# Patient Record
Sex: MALE | Race: WHITE | NOT HISPANIC OR LATINO | URBAN - METROPOLITAN AREA
[De-identification: names, ages, dates, MRNs, and addresses within clinical notes are randomized per-mention and may not be internally consistent; named-entity substitution may affect disease eponyms.]

---

## 2017-01-04 ENCOUNTER — COMMUNICATION - HEALTHEAST (OUTPATIENT)
Dept: FAMILY MEDICINE | Facility: CLINIC | Age: 72
End: 2017-01-04

## 2017-01-04 DIAGNOSIS — I10 HYPERTENSION: ICD-10-CM

## 2017-01-09 ENCOUNTER — OFFICE VISIT - HEALTHEAST (OUTPATIENT)
Dept: PHYSICAL THERAPY | Facility: REHABILITATION | Age: 72
End: 2017-01-09

## 2017-01-09 DIAGNOSIS — M25.673 DECREASED RANGE OF MOTION OF ANKLE: ICD-10-CM

## 2017-01-09 DIAGNOSIS — M62.81 MUSCLE WEAKNESS (GENERALIZED): ICD-10-CM

## 2017-01-09 DIAGNOSIS — M79.671 RIGHT FOOT PAIN: ICD-10-CM

## 2017-03-09 ENCOUNTER — COMMUNICATION - HEALTHEAST (OUTPATIENT)
Dept: FAMILY MEDICINE | Facility: CLINIC | Age: 72
End: 2017-03-09

## 2017-03-24 ENCOUNTER — COMMUNICATION - HEALTHEAST (OUTPATIENT)
Dept: FAMILY MEDICINE | Facility: CLINIC | Age: 72
End: 2017-03-24

## 2017-04-02 ENCOUNTER — COMMUNICATION - HEALTHEAST (OUTPATIENT)
Dept: FAMILY MEDICINE | Facility: CLINIC | Age: 72
End: 2017-04-02

## 2017-04-02 DIAGNOSIS — I10 HYPERTENSION: ICD-10-CM

## 2017-04-24 ENCOUNTER — OFFICE VISIT - HEALTHEAST (OUTPATIENT)
Dept: FAMILY MEDICINE | Facility: CLINIC | Age: 72
End: 2017-04-24

## 2017-04-24 DIAGNOSIS — H11.31 BURST BLOOD VESSEL IN EYE, RIGHT: ICD-10-CM

## 2017-04-24 DIAGNOSIS — I10 ESSENTIAL HYPERTENSION: ICD-10-CM

## 2017-04-24 ASSESSMENT — MIFFLIN-ST. JEOR: SCORE: 1859.58

## 2017-06-23 ENCOUNTER — OFFICE VISIT - HEALTHEAST (OUTPATIENT)
Dept: FAMILY MEDICINE | Facility: CLINIC | Age: 72
End: 2017-06-23

## 2017-06-23 DIAGNOSIS — N13.8 BPH WITH URINARY OBSTRUCTION: ICD-10-CM

## 2017-06-23 DIAGNOSIS — Z00.01 ENCOUNTER FOR GENERAL ADULT MEDICAL EXAMINATION WITH ABNORMAL FINDINGS: ICD-10-CM

## 2017-06-23 DIAGNOSIS — I10 ESSENTIAL HYPERTENSION WITH GOAL BLOOD PRESSURE LESS THAN 130/80: ICD-10-CM

## 2017-06-23 DIAGNOSIS — E78.5 HYPERLIPIDEMIA, UNSPECIFIED HYPERLIPIDEMIA TYPE: ICD-10-CM

## 2017-06-23 DIAGNOSIS — E66.3 OVERWEIGHT (BMI 25.0-29.9): ICD-10-CM

## 2017-06-23 DIAGNOSIS — N40.1 BPH WITH URINARY OBSTRUCTION: ICD-10-CM

## 2017-06-23 LAB
CHOLEST SERPL-MCNC: 189 MG/DL
FASTING STATUS PATIENT QL REPORTED: YES
HDLC SERPL-MCNC: 42 MG/DL
LDLC SERPL CALC-MCNC: 126 MG/DL
TRIGL SERPL-MCNC: 103 MG/DL

## 2017-06-23 ASSESSMENT — MIFFLIN-ST. JEOR: SCORE: 1819.11

## 2017-06-25 ENCOUNTER — COMMUNICATION - HEALTHEAST (OUTPATIENT)
Dept: FAMILY MEDICINE | Facility: CLINIC | Age: 72
End: 2017-06-25

## 2017-07-06 ENCOUNTER — COMMUNICATION - HEALTHEAST (OUTPATIENT)
Dept: FAMILY MEDICINE | Facility: CLINIC | Age: 72
End: 2017-07-06

## 2017-07-06 DIAGNOSIS — I10 HYPERTENSION: ICD-10-CM

## 2017-08-02 ENCOUNTER — COMMUNICATION - HEALTHEAST (OUTPATIENT)
Dept: FAMILY MEDICINE | Facility: CLINIC | Age: 72
End: 2017-08-02

## 2017-08-02 DIAGNOSIS — N40.1 BPH WITH URINARY OBSTRUCTION: ICD-10-CM

## 2017-08-02 DIAGNOSIS — N13.8 BPH WITH URINARY OBSTRUCTION: ICD-10-CM

## 2017-10-13 ENCOUNTER — AMBULATORY - HEALTHEAST (OUTPATIENT)
Dept: NURSING | Facility: CLINIC | Age: 72
End: 2017-10-13

## 2017-10-13 DIAGNOSIS — Z23 NEED FOR IMMUNIZATION AGAINST INFLUENZA: ICD-10-CM

## 2017-11-08 ENCOUNTER — AMBULATORY - HEALTHEAST (OUTPATIENT)
Dept: FAMILY MEDICINE | Facility: CLINIC | Age: 72
End: 2017-11-08

## 2017-11-08 ENCOUNTER — COMMUNICATION - HEALTHEAST (OUTPATIENT)
Dept: FAMILY MEDICINE | Facility: CLINIC | Age: 72
End: 2017-11-08

## 2017-11-08 DIAGNOSIS — N40.1 BPH WITH URINARY OBSTRUCTION: ICD-10-CM

## 2017-11-08 DIAGNOSIS — N13.8 BPH WITH URINARY OBSTRUCTION: ICD-10-CM

## 2017-11-16 ENCOUNTER — RECORDS - HEALTHEAST (OUTPATIENT)
Dept: ADMINISTRATIVE | Facility: OTHER | Age: 72
End: 2017-11-16

## 2017-11-30 ENCOUNTER — RECORDS - HEALTHEAST (OUTPATIENT)
Dept: ADMINISTRATIVE | Facility: OTHER | Age: 72
End: 2017-11-30

## 2017-12-21 ENCOUNTER — RECORDS - HEALTHEAST (OUTPATIENT)
Dept: ADMINISTRATIVE | Facility: OTHER | Age: 72
End: 2017-12-21

## 2018-01-11 ENCOUNTER — OFFICE VISIT - HEALTHEAST (OUTPATIENT)
Dept: FAMILY MEDICINE | Facility: CLINIC | Age: 73
End: 2018-01-11

## 2018-01-11 DIAGNOSIS — E66.3 OVERWEIGHT (BMI 25.0-29.9): ICD-10-CM

## 2018-01-11 DIAGNOSIS — I10 ESSENTIAL HYPERTENSION WITH GOAL BLOOD PRESSURE LESS THAN 130/80: ICD-10-CM

## 2018-01-11 DIAGNOSIS — E78.00 HYPERCHOLESTEREMIA: ICD-10-CM

## 2018-01-11 DIAGNOSIS — N40.1 BPH WITH URINARY OBSTRUCTION: ICD-10-CM

## 2018-01-11 DIAGNOSIS — N13.8 BPH WITH URINARY OBSTRUCTION: ICD-10-CM

## 2018-01-11 DIAGNOSIS — Z01.810 PREOP CARDIOVASCULAR EXAM: ICD-10-CM

## 2018-01-11 LAB
ANION GAP SERPL CALCULATED.3IONS-SCNC: 7 MMOL/L (ref 5–18)
ATRIAL RATE - MUSE: 70 BPM
BUN SERPL-MCNC: 22 MG/DL (ref 8–28)
CALCIUM SERPL-MCNC: 9.6 MG/DL (ref 8.5–10.5)
CHLORIDE BLD-SCNC: 105 MMOL/L (ref 98–107)
CO2 SERPL-SCNC: 29 MMOL/L (ref 22–31)
CREAT SERPL-MCNC: 0.89 MG/DL (ref 0.7–1.3)
DIASTOLIC BLOOD PRESSURE - MUSE: NORMAL MMHG
ERYTHROCYTE [DISTWIDTH] IN BLOOD BY AUTOMATED COUNT: 11.6 % (ref 11–14.5)
GFR SERPL CREATININE-BSD FRML MDRD: >60 ML/MIN/1.73M2
GLUCOSE BLD-MCNC: 92 MG/DL (ref 70–125)
HCT VFR BLD AUTO: 46.9 % (ref 40–54)
HGB BLD-MCNC: 15.9 G/DL (ref 14–18)
INTERPRETATION ECG - MUSE: NORMAL
MCH RBC QN AUTO: 32.1 PG (ref 27–34)
MCHC RBC AUTO-ENTMCNC: 33.8 G/DL (ref 32–36)
MCV RBC AUTO: 95 FL (ref 80–100)
P AXIS - MUSE: 26 DEGREES
PLATELET # BLD AUTO: 259 THOU/UL (ref 140–440)
PMV BLD AUTO: 7.6 FL (ref 7–10)
POTASSIUM BLD-SCNC: 4.9 MMOL/L (ref 3.5–5)
PR INTERVAL - MUSE: 202 MS
QRS DURATION - MUSE: 78 MS
QT - MUSE: 374 MS
QTC - MUSE: 403 MS
R AXIS - MUSE: 48 DEGREES
RBC # BLD AUTO: 4.93 MILL/UL (ref 4.4–6.2)
SODIUM SERPL-SCNC: 141 MMOL/L (ref 136–145)
SYSTOLIC BLOOD PRESSURE - MUSE: NORMAL MMHG
T AXIS - MUSE: 63 DEGREES
VENTRICULAR RATE- MUSE: 70 BPM
WBC: 6.3 THOU/UL (ref 4–11)

## 2018-01-11 ASSESSMENT — MIFFLIN-ST. JEOR: SCORE: 1878.08

## 2018-01-12 ASSESSMENT — MIFFLIN-ST. JEOR: SCORE: 1878.08

## 2018-01-15 ENCOUNTER — ANESTHESIA - HEALTHEAST (OUTPATIENT)
Dept: SURGERY | Facility: HOSPITAL | Age: 73
End: 2018-01-15

## 2018-01-15 ENCOUNTER — COMMUNICATION - HEALTHEAST (OUTPATIENT)
Dept: FAMILY MEDICINE | Facility: CLINIC | Age: 73
End: 2018-01-15

## 2018-01-15 ENCOUNTER — SURGERY - HEALTHEAST (OUTPATIENT)
Dept: SURGERY | Facility: HOSPITAL | Age: 73
End: 2018-01-15

## 2018-01-15 ASSESSMENT — MIFFLIN-ST. JEOR: SCORE: 1884.31

## 2018-03-01 ENCOUNTER — RECORDS - HEALTHEAST (OUTPATIENT)
Dept: ADMINISTRATIVE | Facility: OTHER | Age: 73
End: 2018-03-01

## 2018-07-12 ENCOUNTER — COMMUNICATION - HEALTHEAST (OUTPATIENT)
Dept: FAMILY MEDICINE | Facility: CLINIC | Age: 73
End: 2018-07-12

## 2018-07-12 DIAGNOSIS — I10 HYPERTENSION: ICD-10-CM

## 2018-08-03 ENCOUNTER — OFFICE VISIT - HEALTHEAST (OUTPATIENT)
Dept: FAMILY MEDICINE | Facility: CLINIC | Age: 73
End: 2018-08-03

## 2018-08-03 ENCOUNTER — COMMUNICATION - HEALTHEAST (OUTPATIENT)
Dept: FAMILY MEDICINE | Facility: CLINIC | Age: 73
End: 2018-08-03

## 2018-08-03 DIAGNOSIS — Z00.01 ENCOUNTER FOR GENERAL ADULT MEDICAL EXAMINATION WITH ABNORMAL FINDINGS: ICD-10-CM

## 2018-08-03 DIAGNOSIS — C61 PROSTATIC ADENOCARCINOMA (H): ICD-10-CM

## 2018-08-03 DIAGNOSIS — E66.3 OVERWEIGHT (BMI 25.0-29.9): ICD-10-CM

## 2018-08-03 DIAGNOSIS — E78.00 HYPERCHOLESTEREMIA: ICD-10-CM

## 2018-08-03 DIAGNOSIS — I10 ESSENTIAL HYPERTENSION WITH GOAL BLOOD PRESSURE LESS THAN 130/80: ICD-10-CM

## 2018-08-03 DIAGNOSIS — M25.511 RIGHT SHOULDER PAIN: ICD-10-CM

## 2018-08-03 LAB
ANION GAP SERPL CALCULATED.3IONS-SCNC: 10 MMOL/L (ref 5–18)
BUN SERPL-MCNC: 16 MG/DL (ref 8–28)
CALCIUM SERPL-MCNC: 9.2 MG/DL (ref 8.5–10.5)
CHLORIDE BLD-SCNC: 107 MMOL/L (ref 98–107)
CHOLEST SERPL-MCNC: 198 MG/DL
CO2 SERPL-SCNC: 27 MMOL/L (ref 22–31)
CREAT SERPL-MCNC: 0.92 MG/DL (ref 0.7–1.3)
FASTING STATUS PATIENT QL REPORTED: YES
GFR SERPL CREATININE-BSD FRML MDRD: >60 ML/MIN/1.73M2
GLUCOSE BLD-MCNC: 100 MG/DL (ref 70–125)
HDLC SERPL-MCNC: 44 MG/DL
LDLC SERPL CALC-MCNC: 135 MG/DL
POTASSIUM BLD-SCNC: 4.8 MMOL/L (ref 3.5–5)
SODIUM SERPL-SCNC: 144 MMOL/L (ref 136–145)
TRIGL SERPL-MCNC: 96 MG/DL

## 2018-08-03 RX ORDER — AMLODIPINE BESYLATE 5 MG/1
5 TABLET ORAL DAILY
Qty: 90 TABLET | Refills: 3 | Status: SHIPPED | OUTPATIENT
Start: 2018-08-03

## 2018-08-03 ASSESSMENT — MIFFLIN-ST. JEOR: SCORE: 1855.28

## 2018-08-28 ENCOUNTER — COMMUNICATION - HEALTHEAST (OUTPATIENT)
Dept: FAMILY MEDICINE | Facility: CLINIC | Age: 73
End: 2018-08-28

## 2018-08-28 DIAGNOSIS — Z12.5 SCREENING FOR PROSTATE CANCER: ICD-10-CM

## 2018-09-05 ENCOUNTER — AMBULATORY - HEALTHEAST (OUTPATIENT)
Dept: NURSING | Facility: CLINIC | Age: 73
End: 2018-09-05

## 2018-09-05 ENCOUNTER — AMBULATORY - HEALTHEAST (OUTPATIENT)
Dept: LAB | Facility: CLINIC | Age: 73
End: 2018-09-05

## 2018-09-05 DIAGNOSIS — Z23 NEED FOR VACCINATION: ICD-10-CM

## 2018-09-05 DIAGNOSIS — Z12.5 SCREENING FOR PROSTATE CANCER: ICD-10-CM

## 2018-09-05 LAB — PSA SERPL-MCNC: 1.4 NG/ML (ref 0–6.5)

## 2018-09-06 ENCOUNTER — COMMUNICATION - HEALTHEAST (OUTPATIENT)
Dept: FAMILY MEDICINE | Facility: CLINIC | Age: 73
End: 2018-09-06

## 2018-10-02 ENCOUNTER — RECORDS - HEALTHEAST (OUTPATIENT)
Dept: ADMINISTRATIVE | Facility: OTHER | Age: 73
End: 2018-10-02

## 2019-01-15 ENCOUNTER — RECORDS - HEALTHEAST (OUTPATIENT)
Dept: ADMINISTRATIVE | Facility: OTHER | Age: 74
End: 2019-01-15

## 2019-07-17 ENCOUNTER — RECORDS - HEALTHEAST (OUTPATIENT)
Dept: ADMINISTRATIVE | Facility: OTHER | Age: 74
End: 2019-07-17

## 2021-05-30 VITALS — HEIGHT: 78 IN | BODY MASS INDEX: 25.49 KG/M2 | WEIGHT: 220.3 LBS

## 2021-05-31 VITALS — WEIGHT: 227 LBS | HEIGHT: 77 IN | BODY MASS INDEX: 26.8 KG/M2

## 2021-05-31 VITALS — WEIGHT: 231 LBS | HEIGHT: 76 IN | BODY MASS INDEX: 28.13 KG/M2

## 2021-05-31 VITALS — BODY MASS INDEX: 25.27 KG/M2 | WEIGHT: 214 LBS | HEIGHT: 77 IN

## 2021-06-01 VITALS — HEIGHT: 76 IN | BODY MASS INDEX: 27.35 KG/M2 | WEIGHT: 224.6 LBS

## 2021-06-08 NOTE — PROGRESS NOTES
[facility] Discharge Summary    Patient Name: Boaz Braxton  Date: 3/3/2017  Referral Diagnosis: Ankle enthesopathy  Referring provider: Huey Newton DPM  Visit Diagnosis:     ICD-10-CM    1. Right foot pain M79.671    2. Muscle weakness (generalized) M62.81    3. Decreased range of motion of ankle M25.673        Goal Status:  See status in note below.    Patient was seen for 7 visits from 11/23/16 to 1/9/17 with 0 missed appointments.    The patient met goals and has demonstrated understanding of and independence in the home program for self-care, and progression to next steps.  He will initiate contact if questions or concerns arise.    Therapy will be discontinued at this time.  The patient will need a new referral to resume.    Thank you for your referral.  Kristal Martínez PT, DPT, OCS, CLT  3/3/2017   10:32 AM      [facility] Daily Progress     Patient Name: Boaz Braxton  Date: 1/9/2017  Visit #: 7/12  Referring Provider: Huey Newton DPM  Referring Diagnosis: Ankle enthesopathy   Visit Diagnosis:     ICD-10-CM    1. Right foot pain M79.671    2. Muscle weakness (generalized) M62.81    3. Decreased range of motion of ankle M25.673      Assessment:     Pt feeling great improvement to pain sx with use of new orthotic that has a metatarsal arch and decreased filling on lateral border along with physical therapy treatments.  Pt with improved tolerance to strength training with lateral ankle strengthening non painful at this time.  Pt with R hip pain decreased back to PLOF.    Patient is benefitting from skilled physical therapy and is making steady progress toward functional goals.  Patient is appropriate to continue with skilled physical therapy intervention, as indicated by initial plan of care.    Goal Status:  Pt. will demonstrate/verbalize independence in self-management of condition in : 12 weeks - MET    Pt. will be able to walk : 30 minutes;on even surfaces;with less pain;with  less difficulty;for community mobility;for exercise/recreation;in 12 weeks - MET      Plan / Patient Education:     Hold episode of care open until the end of February when pt gets back from [place].  Pt to perform I with HEP while away.  If pt has no need to return to PT will then D/C with I to HEP.  Thank you for this referral!    Subjective:     Pt reports pain rating 2/10 tenderness around R lateral ankle today only when pt pushes on it.  Pt reports he has been able to walk 45 minutes to 1 hour without R foot sx bothering.    Exercises going well and pt doing a couple times a day.    Objective:     R ankle DF 7  (was 1 ), PF 65  (was 60 )  Non-painful PF/ever with mild pain with DF/ever  Mild tender inferior to lateral malleolus minimal R    Treatment Today      TREATMENT MINUTES COMMENTS   Evaluation     Self-care/ Home management     Manual therapy 4 Reassess R ankle palption   Neuromuscular Re-education     Therapeutic Activity     Therapeutic Exercises 20 Reassessed R ankle ROM and strength. Reviewed and finalized HEP with written instructions given.   Gait training     Modality____ionto___________                Total 24    Blank areas are intentional and mean the treatment did not include these items.       Kristal Martínez PT, DPT, OCS, CLT  1/9/2017  9:19 AM

## 2021-06-10 NOTE — PROGRESS NOTES
Assessment:     Boaz was seen today for eye problem.    Diagnoses and all orders for this visit:    Burst blood vessel in eye, right    Essential hypertension          Plan:     1. Burst blood vessel in eye, right  I would advise lata Sandra works in the yard to work at a slower pace and not stress himself with heavy work at a fast pace.  Reassurance given that the blood vessel that burst is rapidly healing.  Keep his blood pressure under control as it is today.    2. Essential hypertension  Continue with normal blood pressure pills.        Subjective:      Smith is a 71 y.o. male presenting to my clinic for evaluation of a burst blood vessel on the right eye.  No purulent drainage no photophobia to his knowledge no foreign body in the eye.  No pain.  Yesterday morning after a day of heavy work in the yard wife noted about a quarter of the conjunctiva seemed a little bloody with redness.  As far as he knows no trauma occurred to the eye.  Today the redness was about 60% gone but a rim still persisted.  This afternoon a very small rim persists.  Boaz tells us that he was working rather hard in the yard doing trimming of tree branches and collecting of yard waste.  He worked most of the day on Saturday.  He pushes himself at a fairly high pace.  He did not notice any dizziness or shortness of breath or chest pain.  He does not have known heart disease or cardiovascular neck disease..        Current Outpatient Prescriptions on File Prior to Visit   Medication Sig Dispense Refill     amLODIPine (NORVASC) 5 MG tablet TAKE ONE TABLET BY MOUTH ONE TIME DAILY 90 tablet 1     aspirin 81 MG EC tablet Take 81 mg by mouth daily.       ERICH CIT/MAG/D3/ZN//NINO/BOR (CITRACAL PLUS MAGNESIUM ORAL) Take 1 tablet by mouth daily.       cholecalciferol, vitamin D3, 1,000 unit tablet Take 1,000 Units by mouth daily.       DOCOSAHEXANOIC ACID/EPA (FISH OIL ORAL) Take by mouth.       FOLIC ACID/MV,FE,OTHER MIN (CENTRUM ORAL)  "Take 1 tablet by mouth daily.       GLUCOSAMINE HCL ORAL Take 1,000 mg by mouth daily.       FLUAD [id number], 65 YR UP,,PF, 45 mcg (15 mcg x 3)/0.5 mL Syrg        PREVNAR 13, PF, 0.5 mL vaccine        No current facility-administered medications on file prior to visit.      No Known Allergies  Past Medical History:   Diagnosis Date     Hypertension      History reviewed. No pertinent surgical history.  Social History     Social History     Marital status:      Spouse name: N/A     Number of children: N/A     Years of education: N/A     Occupational History     Not on file.     Social History Main Topics     Smoking status: Never Smoker     Smokeless tobacco: Never Used     Alcohol use No     Drug use: No     Sexual activity: Not on file     Other Topics Concern     Not on file     Social History Narrative     Family History   Problem Relation Age of Onset     Diabetes Mother      Hypertension Mother      Hypertension Sister        ROS:  I have performed a 10 point ROS.  All pertinent positives and negatives are found in the HPI.  All others are negative.      Objective:     Physical Exam:  /64 (Patient Site: Right Arm, Patient Position: Sitting, Cuff Size: Adult Large)  Pulse 88  Ht 6' 5.5\" (1.969 m)  Wt 220 lb 4.8 oz (99.9 kg)  BMI 25.79 kg/m2  General Appearance: Alert, cooperative, no distress, appears stated age    Head: Normocephalic, without obvious abnormality, atraumatic  Eyes: PERRL, conjunctiva/corneas clear, EOM's intact/  A small rim of blood outside the pupil noted on the right eye from a presumed broken blood vessel  Ears: Normal TM's and external ear canals, both ears  Nose: Nares normal, septum midline,mucosa normal, no drainage  Throat: Lips, mucosa, and tongue normal; teeth and gums normal  Neck:   thyroid: not enlarged, symmetric, no tenderness/mass/nodules; no carotid bruit or JVD  Lungs: Clear to auscultation bilaterally, respirations unlabored  Heart: Regular rate and rhythm, " S1 and S2 normal, no murmur, rub, or gallop,     Extremities: Extremities normal, atraumatic, no cyanosis or edema  Skin: Skin color, texture, turgor normal, no rashes or lesions  Lymph nodes: Cervical, supraclavicular, and axillary nodes normal  Neurologic: Normal   Mental status: Stable

## 2021-06-11 NOTE — PROGRESS NOTES
Assessment and Plan:     1. Encounter for general adult medical examination with abnormal findings  Annual wellness visit completed.  Prior colonoscopy August 17, 2011 told her repeat 10 year interval.  Immunizations reviewed and up-to-date.  Patient should increase exerci ideally 30 minutes minimum of 5 days per week.  Annual wellness visits to continue.    2. Overweight (BMI 25.0-29.9)  Dietary and exercise modifications reviewed for weight goal less than 210 pounds initially, less than 200 pounds ideally.    3. BPH with urinary obstruction  - tamsulosin (FLOMAX) 0.4 mg Cp24; Take 1 capsule (0.4 mg total) by mouth Daily after breakfast.  Dispense: 90 capsule; Refill: 3  Trial of tamsulosin 0.4 mg daily over next 90 days with history of nocturia up to 3 times per night.  Notify of side effects or no improvement otherwise anticipate continuation with reassessment at scheduled follow-up.    4. Essential hypertension with goal blood pressure less than 130/80  Continued use of amlodipine 5 mg daily anticipated.  Check basic metabolic panel for fasting glucose as well as renal function.  - Basic Metabolic Panel    5. Hyperlipidemia, unspecified hyperlipidemia type  Dietary and exercise modifications as noted above with weight goal less than 200 pounds ideally.  - Lipid Cascade       The patient's current medical problems were reviewed.    I have had an Advance Directives discussion with the patient.  The following high BMI interventions were performed this visit: encouragement to exercise, weight monitoring, weight loss from baseline weight and lifestyle education regarding diet.  Weight goal < 210 pounds initially, < 200 pounds ideally.     The following health maintenance schedule was reviewed with the patient and provided in printed form in the after visit summary:   Health Maintenance   Topic Date Due     INFLUENZA VACCINE RULE BASED (Season Ended) 08/01/2017     FALL RISK ASSESSMENT  04/24/2018     ADVANCE  "DIRECTIVES DISCUSSED WITH PATIENT  2021     COLONOSCOPY  2021     TD 18+ HE  2026     PNEUMOCOCCAL POLYSACCHARIDE VACCINE AGE 65 AND OVER  Completed     PNEUMOCOCCAL CONJUGATE VACCINE FOR ADULTS (PCV13 OR PREVNAR)  Completed     ZOSTER VACCINE  Completed        Subjective:   Chief Complaint: Boaz Braxton is an 71 y.o. male here for an Annual Wellness visit.     HPI: Patient seen today for annual wellness visit.  Risks associated with lack of exercise otherwise doing well.  History of hypertension.  Continues amlodipine 5 mg daily.  Tolerating fine however does have intermittent lightheadedness 3-4 times previously lasting up to 30 seconds while standing.  Nocturia up to 3 times a night however typically twice a night.  No burning or urgency or frequency.  No post void dribbling.  No urinary hesitancy.  No chest pain or palpitations.  No headache.  Does have annual skin exams through [facility] in [place].  History of hyperlipidemia.  Immunizations up-to-date.  Colonoscopy 2011 told her repeat 10 year interval.     \"Mariella\" x  (right breast lump with bx at [facility] 4/3/08)   2 sons (32, 29)   Retired 07 (3M x 33 years with [employer])   Dad -  89 natural causes   Mom - living DM, HTN   2 bros -   3 sis - HTN (at least 2 of 3)   Exercise - [facility] (walk, weights, eliptical x 30 mins) x 3-4 times/week   [facility]   Surgeries: none   Hospitalizations: previously for LBP   No smoking ever   EtOH: 1x/week only   Traveled to [place] 7[place]12 x 2 weeks ([place] between [place] and [place] to [place])     Review of Systems:  Please see above.  The rest of the review of systems are negative for all systems.    Patient Care Team:  Harsh Luke MD as PCP - General     Patient Active Problem List   Diagnosis     Hyperlipidemia     Hypertension     Foot Pain (Soft Tissue)     Dermatitis     Dupuytren's contracture of " "right hand     Nocturia     Ankylosis of spine     Right shoulder pain     Overweight (BMI 25.0-29.9)     Motion sickness     Past Medical History:   Diagnosis Date     Hypertension       History reviewed. No pertinent surgical history.   Family History   Problem Relation Age of Onset     Diabetes Mother      Hypertension Mother      Hypertension Sister       Social History     Social History     Marital status:      Spouse name: N/A     Number of children: N/A     Years of education: N/A     Occupational History     Not on file.     Social History Main Topics     Smoking status: Never Smoker     Smokeless tobacco: Never Used     Alcohol use No     Drug use: No     Sexual activity: Not on file     Other Topics Concern     Not on file     Social History Narrative      Current Outpatient Prescriptions   Medication Sig Dispense Refill     amLODIPine (NORVASC) 5 MG tablet TAKE ONE TABLET BY MOUTH ONE TIME DAILY 90 tablet 1     aspirin 81 MG EC tablet Take 81 mg by mouth daily.       ERICH CIT/MAG/D3/ZN//NINO/BOR (CITRACAL PLUS MAGNESIUM ORAL) Take 1 tablet by mouth daily.       cholecalciferol, vitamin D3, 1,000 unit tablet Take 1,000 Units by mouth daily.       DOCOSAHEXANOIC ACID/EPA (FISH OIL ORAL) Take by mouth.       FOLIC ACID/MV,FE,OTHER MIN (CENTRUM ORAL) Take 1 tablet by mouth daily.       GLUCOSAMINE HCL ORAL Take 1,000 mg by mouth daily.       FLUAD [id number], 65 YR UP,,PF, 45 mcg (15 mcg x 3)/0.5 mL Syrg        PREVNAR 13, PF, 0.5 mL vaccine        tamsulosin (FLOMAX) 0.4 mg Cp24 Take 1 capsule (0.4 mg total) by mouth Daily after breakfast. 90 capsule 3     No current facility-administered medications for this visit.       Objective:   Vital Signs:   Visit Vitals     /80     Pulse 72     Ht 6' 4.75\" (1.949 m)     Wt 214 lb (97.1 kg)     BMI 25.54 kg/m2        VisionScreening:  No exam data present     PHYSICAL EXAM    General Appearance:    Alert, cooperative, no distress, appears stated age.   "   Head:    Normocephalic, without obvious abnormality, atraumatic   Eyes:    PERRL, conjunctiva/corneas clear, EOM's intact, fundi     benign, both eyes.  Glasses.        Ears:    Normal TM's and external ear canals, both ears   Nose:   Nares normal, septum midline, mucosa normal, no drainage    or sinus tenderness   Throat:   Lips, mucosa, and tongue normal; teeth and gums normal   Neck:   Supple, symmetrical, trachea midline, no adenopathy;        thyroid:  No enlargement/tenderness/nodules; no carotid    bruit or JVD   Back:     Symmetric, no curvature, ROM normal, no CVA tenderness   Lungs:     Clear to auscultation bilaterally, respirations unlabored   Chest wall:    No tenderness or deformity   Heart:    Regular rate and rhythm, S1 and S2 normal, no murmur, rub   or gallop   Abdomen:     Soft, non-tender, bowel sounds active all four quadrants,     no masses, no organomegaly.  Small umbilical hernia.   Genitalia:    Normal male without lesion, discharge or tenderness.  No inguinal hernia noted.     Rectal:    Normal tone.  Prostate mildly /symmetric, no masses or tenderness.   Extremities:   Extremities normal, atraumatic, no cyanosis or edema   Pulses:   2+ and symmetric all extremities   Skin:   Skin color, texture, turgor normal, no rashes or lesions   Lymph nodes:   Cervical, supraclavicular, and axillary nodes normal   Neurologic:   CNII-XII intact. Normal strength, sensation and reflexes       throughout        Assessment Results 6/23/2017   Activities of Daily Living No help needed   Instrumental Activities of Daily Living No help needed   Get Up and Go Score Less than 12 seconds   Mini Cog Total Score 4     A Mini-Cog score of 0-2 suggests the possibility of dementia, score of 3-5 suggests no dementia    Identified Health Risks:     He is at risk for lack of exercise and has been provided with information to increase physical activity for the benefit of his well-being.  Patient's advanced directive was  discussed and I am comfortable with the patient's wishes.

## 2021-06-15 NOTE — ANESTHESIA PREPROCEDURE EVALUATION
Anesthesia Evaluation      Patient summary reviewed   No history of anesthetic complications     Airway   Mallampati: I  Neck ROM: full   Pulmonary - negative ROS and normal exam                          Cardiovascular - normal exam  Exercise tolerance: > or = 4 METS  (+) hypertension, , hypercholesterolemia,      Neuro/Psych - negative ROS     Endo/Other    (+) obesity,      GI/Hepatic/Renal - negative ROS      Other findings: BPH with obstruction. Ankylosis of spine.  Hg 15.9, K nl, GFR over 60.      Dental - normal exam                        Anesthesia Plan  Planned anesthetic: general LMA    ASA 2   Induction: intravenous   Anesthetic plan and risks discussed with: patient    Post-op plan: routine recovery

## 2021-06-15 NOTE — PROGRESS NOTES
Assessment/Plan:     Visit for Preoperative Exam.    1. Preop cardiovascular exam  Electrocardiogram Perform and Read    HM2(CBC w/o Differential)   2. BPH with urinary obstruction  Basic Metabolic Panel   3. Overweight (BMI 25.0-29.9)     4. Essential hypertension with goal blood pressure less than 130/80  Basic Metabolic Panel   5. Hypercholesteremia         Patient approved for surgery with general or local anesthesia. Postoperative pain to be managed by surgeon during post-operative Global Surgical Package timeframe, typically 30-60 days for major surgery, and less for others. Labs will be done as indicated. Above recommendations were reviewed with the patient. Follow up as needed. Proceed with proposed surgery without additional clinical clarifications. No Cardiology consultation or non-invasive testing.     Preoperative Cardiac Risk Stratificaiton and Management Cardiac risk assessment - Low  Beta-blocker protocal - Not indicated  NO active Cardiac Conditions including no Unstable/Severe Angina, Recent Myocardial Infarction (<3 mo), New, worsening or decompensated heart failure, Significant Arrhythmias, or Severe Valvular Disease  High Risk Surgery  Functional Capacity > 4 METS  No Beta Blockage/cardiac evaluation: No Ischemic Heart Disease; Compensated or prior heart failure; Diabetes mellitus; Chronic kidney disease; Cerebrovascular disease.     D/C ASA one week prior.  Has stopped all medications listed other than amlodipine 5 mg daily.  No ankle swelling noted with amlodipine.    EKG today:  NSR with normal axis.  Good R wave progression.    Check BMP for medication monitoring.    Check preop CBC prior to scheduled TURP.    Okay for scheduled procedure.      Subjective:     Scheduled Procedure: TURP  Surgery Date:  1/15/18  Surgery Location:  [facility]  Surgeon:  Dr. Chisholm    H/O BPH with urinary obstruction and nocturia.  H/O HTN noted.  Tolerating amlodipine 5 mg daily.  Tolerating fine however  "does have h/o intermittent lightheadedness 3-4 times previously lasting up to 30 seconds while standing.  No chest pain.  No SOB.  No recent illness.  Immunizations UTD.  Nocturia up to 3 times a night however typically twice a night.  No burning or urgency or frequency.  No post void dribbling.  No urinary hesitancy.  No chest pain or palpitations.  No headache.  Does have annual skin exams through [facility] in [place].  History of hyperlipidemia.  Colonoscopy 2011 told to repeat at 10 year interval as indicated.     \"Mariella\" x  (right breast lump with bx at [facility] 4/3/08)   2 sons (32, 29)   Retired 07 (3M x 33 years with [employer])   Dad -  89 natural causes   Mom - living DM, HTN   2 bros -   3 sis - HTN (at least 2 of 3)   Exercise - [facility] (walk, weights, eliptical x 30 mins) x 3-4 times/week   [facility]   Surgeries: none   Hospitalizations: previously for LBP   No smoking ever   EtOH: 1x/week only   Traveled to [place] 7/5/12 x 2 weeks ([place] between [place] and [place] to [place])     Current Outpatient Prescriptions   Medication Sig Dispense Refill     amLODIPine (NORVASC) 5 MG tablet TAKE ONE TABLET BY MOUTH ONE TIME DAILY 90 tablet 3     aspirin 81 MG EC tablet Take 81 mg by mouth daily.       ERICH CIT/MAG/D3/ZN//NINO/BOR (CITRACAL PLUS MAGNESIUM ORAL) Take 1 tablet by mouth daily.       cholecalciferol, vitamin D3, 1,000 unit tablet Take 1,000 Units by mouth daily.       DOCOSAHEXANOIC ACID/EPA (FISH OIL ORAL) Take by mouth.       FOLIC ACID/MV,FE,OTHER MIN (CENTRUM ORAL) Take 1 tablet by mouth daily.       GLUCOSAMINE HCL ORAL Take 1,000 mg by mouth daily.       FLUAD [id number], 65 YR UP,,PF, 45 mcg (15 mcg x 3)/0.5 mL Syrg        PREVNAR 13, PF, 0.5 mL vaccine        No current facility-administered medications for this visit.        No Known Allergies    Immunization History   Administered Date(s) " Administered     DT (pediatric) 01/01/1999     Hep A, Adult IM (19yr & older) 02/25/2016     Hep A, historic 09/22/1995, 04/02/1996     Hep B, historic 11/05/1992, 12/07/1992, 05/21/1993     IPV 08/21/1989     Influenza H5p2-82, 12/23/2009     Influenza high dose, seasonal 09/17/2015, 10/13/2017     Influenza, inj, historic,unspecified 10/03/2013     Influenza, seasonal,quad inj 6-35 mos 11/26/2008, 10/05/2010, 09/27/2011, 10/16/2012, 10/14/2014     Pneumo Conj 13-V (2010&after) 03/11/2015     Pneumo Polysac 23-V 02/22/2011     Td, adult adsorbed, PF 03/16/2016     Td,adult,historic,unspecified 09/06/2007     Tdap 09/06/2007     ZOSTER 09/06/2007       Patient Active Problem List   Diagnosis     Hyperlipidemia     Hypertension     Foot Pain (Soft Tissue)     Dermatitis     Dupuytren's contracture of right hand     Nocturia     Ankylosis of spine     Right shoulder pain     Overweight (BMI 25.0-29.9)     Motion sickness       Past Medical History:   Diagnosis Date     Hypertension        Social History     Social History     Marital status:      Spouse name: N/A     Number of children: N/A     Years of education: N/A     Occupational History     Not on file.     Social History Main Topics     Smoking status: Never Smoker     Smokeless tobacco: Never Used     Alcohol use No     Drug use: No     Sexual activity: Not on file     Other Topics Concern     Not on file     Social History Narrative       History reviewed. No pertinent surgical history.    History of Present Illness  Recent Health  Fever: no  Chills: no  Fatigue: no  Chest Pain: no  Cough: no  Dyspnea: no  Urinary Frequency: nocturia  Nausea: no  Vomiting: no  Diarrhea: no  Abdominal Pain: no  Easy Bruising: no  Lower Extremity Swelling: no  Poor Exercise Tolerance: no    Most recent Health Maintenance Visit:  6/23/17    Pertinent History  Prior Anesthesia: no  Previous Anesthesia Reaction:  no  Diabetes: no  Cardiovascular Disease: yes, HTN  Pulmonary  "Disease: no  Renal Disease: no  GI Disease: no  Sleep Apnea: no  Thromboembolic Problems: no  Clotting Disorder: no  Bleeding Disorder: no  Transfusion Reaction: no  Impaired Immunity: no  Steroid use in the last 6 months: no  Frequent Aspirin use: yes, ASA 81 mg daily however has stopped > 1 week prior to surgery    Family history of noncontributory  Dad -  89 natural causes   Mom - living DM, HTN   2 bros -   3 sis - HTN (at least 2 of 3)     Social history of patient does not wear denture or partial plates and there are no concerns regarding care after surgery    After surgery, the patient plans to recover at home with family.    Review of Systems  Pertinent items are noted in HPI.  A 12 point comprehensive review of systems was negative except as noted.          Objective:         Vitals:    18 1133   BP: 130/72   Pulse: 72   Weight: (!) 227 lb (103 kg)   Height: 6' 4.75\" (1.949 m)       Physical Exam:    General Appearance: Alert, cooperative, no distress, appears stated age.  Mildly overweight.  Head: Normocephalic, without obvious abnormality, atraumatic  Eyes: PERRL, conjunctiva/corneas clear, EOM's intact  Ears: Normal TM's and external ear canals, both ears  Nose: Nares normal, septum midline,mucosa normal, no drainage  Throat: Lips, mucosa, and tongue normal; teeth and gums normal  Neck: Supple, symmetrical, trachea midline, no adenopathy;  thyroid: not enlarged, symmetric, no tenderness/mass/nodules; no carotid bruit or JVD  Back: Symmetric, no curvature, ROM normal, no CVA tenderness  Lungs: Clear to auscultation bilaterally, respirations unlabored  Heart: Regular rate and rhythm, S1 and S2 normal, no murmur, rub, or gallop,  Abdomen: Soft, non-tender, bowel sounds active all four quadrants,  no masses, no organomegaly  Genitourinary:  deferred  Musculoskeletal: Normal range of motion. No joint swelling or deformity.   Extremities: Extremities normal, atraumatic, no cyanosis or " edema  Skin: Skin color, texture, turgor normal, no rashes or lesions  Lymph nodes: Cervical, supraclavicular, and axillary nodes normal  Neurologic: He is alert. He has normal reflexes.   Psychiatric: He has a normal mood and affect.

## 2021-06-15 NOTE — ANESTHESIA POSTPROCEDURE EVALUATION
Patient: Boaz Braxton  CYSTOSCOPY, BIPOLAR TRANSURETHRAL RESECTION OF PROSTATE, EXAM UNDER ANESTHESIA, UROLOGICAL  Anesthesia type: general    Patient location: PACU  Last vitals:   Vitals:    01/15/18 1710   BP: 157/77   Pulse: 77   Resp: 19   Temp:    SpO2: 92%     Post vital signs: stable  Level of consciousness: alert and conversant  Post-anesthesia pain: pain controlled  Post-anesthesia nausea and vomiting: no  Pulmonary: room air  Cardiovascular: stable and blood pressure at baseline  Hydration: adequate  Anesthetic events: no    QCDR Measures:  ASA# 11 - Ronit-op Cardiac Arrest: ASA11B - Patient did NOT experience unanticipated cardiac arrest  ASA# 12 - Ronit-op Mortality Rate: ASA12B - Patient did NOT die  ASA# 13 - PACU Re-Intubation Rate: ASA13B - Patient did NOT require a new airway mgmt  ASA# 10 - Composite Anes Safety: ASA10A - No serious adverse event    Additional Notes:

## 2021-06-15 NOTE — ANESTHESIA CARE TRANSFER NOTE
Last vitals:   Vitals:    01/15/18 1630   BP: (!) 185/78   Pulse: 82   Resp: 12   Temp: 36.4  C (97.6  F)   SpO2: 100%     Patient's level of consciousness is drowsy  Spontaneous respirations: yes  Maintains airway independently: yes  Dentition unchanged: yes  Oropharynx: oropharynx clear of all foreign objects    QCDR Measures:  ASA# 20 - Surgical Safety Checklist: WHO surgical safety checklist completed prior to induction  PQRS# 430 - Adult PONV Prevention: 4558F - Pt received => 2 anti-emetic agents (different classes) preop & intraop  ASA# 8 - Peds PONV Prevention: NA - Not pediatric patient, not GA or 2 or more risk factors NOT present  PQRS# 424 - Ronit-op Temp Management: 4559F - At least one body temp DOCUMENTED => 35.5C or 95.9F within required timeframe  PQRS# 426 - PACU Transfer Protocol: - Transfer of care checklist used  ASA# 14 - Acute Post-op Pain: ASA14B - Patient did NOT experience pain >= 7 out of 10

## 2021-06-16 PROBLEM — C61 PROSTATIC ADENOCARCINOMA (H): Status: ACTIVE | Noted: 2018-08-03

## 2021-06-19 NOTE — PROGRESS NOTES
Assessment and Plan:       1. Encounter for general adult medical examination with abnormal findings  Annual wellness visit completed.  Risks associated with lack of exercise otherwise normal.  Immunizations up-to-date other than recommending Shingrix immunization through local pharmacy.  Annual wellness visits to continue.    2. Overweight (BMI 25.0-29.9)  Dietary and exercise modification for weight goal less than 215 pounds initially, less than 220 pounds ideally.    3. Essential hypertension with goal blood pressure less than 130/80  Hypertension, stable with blood pressure 134/70 however did not take amlodipine 5 mg this morning.  Will continue current dose and reassess at follow-up visit.  - amLODIPine (NORVASC) 5 MG tablet; Take 1 tablet (5 mg total) by mouth daily.  Dispense: 90 tablet; Refill: 3  - Basic Metabolic Panel    4. Hypercholesteremia  History of hypercholesterolemia, diet-controlled.  Check lipid cascade.  - Lipid Cascade    5. Prostatic adenocarcinoma (H)  History of BPH with urinary obstruction.  Status post TURP January 15, 2018 with Dr. Lorraine scott.  Pathology showing Azul 3+3 = 6 adenocarcinoma.  Urologist suggest annual follow-up otherwise no intervention required.    6. Right shoulder pain  Right shoulder pain consistent with infraspinatus muscle strain on exam.  Avoidance of exacerbating triggers.  Anticipate self-limited.  Tylenol Extra Strength using 2 tablets 3 times daily as needed.  Follow-up with worsening or nonimprovement.        The patient's current medical problems were reviewed.    I have had an Advance Directives discussion with the patient.  The following high BMI interventions were performed this visit: encouragement to exercise, weight monitoring, weight loss from baseline weight and lifestyle education regarding diet  The following health maintenance schedule was reviewed with the patient and provided in printed form in the after visit summary:   Health Maintenance  "  Topic Date Due     FALL RISK ASSESSMENT  2018     INFLUENZA VACCINE RULE BASED (1) 2018     COLONOSCOPY  2021     ADVANCE DIRECTIVES DISCUSSED WITH PATIENT  2022     TD 18+ HE  2026     PNEUMOCOCCAL POLYSACCHARIDE VACCINE AGE 65 AND OVER  Completed     PNEUMOCOCCAL CONJUGATE VACCINE FOR ADULTS (PCV13 OR PREVNAR)  Completed     ZOSTER VACCINE  Completed        Subjective:     Chief Complaint: Boaz Braxton is an 72 y.o. male here for an Annual Wellness visit.     HPI: Patient seen today for annual wellness visit.  Doing well.  Does have some right shoulder pain more posterior aspect involving scapular region.  No specific injury or trauma.  Continues amlodipine 5 mg daily for hypertension.  Diet-controlled cholesterol elevation.  Has history of BPH and did have TURP procedure January 15, 2018 with Dr. Askew with prostatic adenocarcinoma with [place] 3+3 = 6.  Told to follow up on annual basis without further intervention required at this time.  Has not had Shingrix immunization.    Review of Systems:  Please see above.  The rest of the review of systems are negative for all systems.     \"Mariella\" x  (right breast lump with bx at [facility] 4/3/08)   2 sons (32, 29)   Retired 07 (3M x 33 years with [employer])   Dad -  89 natural causes   Mom - living DM, HTN   2 bros -   3 sis - HTN (at least 2 of 3)   Exercise - [facility] (walk, weights, eliptical x 30 mins) x 3-4 times/week   [facility]   Surgeries: none   Hospitalizations: previously for LBP   No smoking ever   EtOH: 1x/week only   Traveled to [place] 7/5/12 x 2 weeks ([place] between [place] and [place] to [place])     Patient Care Team:  Harsh Luke MD as PCP - General     Patient Active Problem List   Diagnosis     Hypercholesteremia     Essential hypertension with goal blood pressure less than 130/80     Foot Pain (Soft Tissue)     Dermatitis     " Dupuytren's contracture of right hand     Nocturia     Ankylosis of spine     Right shoulder pain     Overweight (BMI 25.0-29.9)     Motion sickness     Prostatic adenocarcinoma (H)     Past Medical History:   Diagnosis Date     BPH (benign prostatic hyperplasia)      Hypercholesteremia      Hypertension       Past Surgical History:   Procedure Laterality Date     ID TRANSURETHRAL ELEC-SURG PROSTATECTOM N/A 1/15/2018    Procedure: CYSTOSCOPY, BIPOLAR TRANSURETHRAL RESECTION OF PROSTATE;  Surgeon: Alxeis Giles MD;  Location: [facility];  Service: Urology     ID UROLOGY SURGERY PROCEDURE UNLISTED N/A 1/15/2018    Procedure: EXAM UNDER ANESTHESIA, UROLOGICAL;  Surgeon: Alexis Giles MD;  Location: [facility];  Service: Urology      Family History   Problem Relation Age of Onset     Diabetes Mother      Hypertension Mother      Hypertension Sister       Social History     Social History     Marital status:      Spouse name: N/A     Number of children: N/A     Years of education: N/A     Occupational History     Not on file.     Social History Main Topics     Smoking status: Never Smoker     Smokeless tobacco: Never Used     Alcohol use No     Drug use: No     Sexual activity: Not on file     Other Topics Concern     Not on file     Social History Narrative      Current Outpatient Prescriptions   Medication Sig Dispense Refill     amLODIPine (NORVASC) 5 MG tablet Take 1 tablet (5 mg total) by mouth daily. 90 tablet 3     ERICH CIT/MAG/D3/ZN//NINO/BOR (CITRACAL PLUS MAGNESIUM ORAL) Take 1 tablet by mouth daily.       DOCOSAHEXANOIC ACID/EPA (FISH OIL ORAL) Take by mouth.       FOLIC ACID/MV,FE,OTHER MIN (CENTRUM ORAL) Take 1 tablet by mouth daily.       GLUCOSAMINE HCL ORAL Take 1,000 mg by mouth daily.       senna-docusate (PERICOLACE) 8.6-50 mg tablet Take 1 tablet by mouth 2 (two) times a day. 20 tablet 0     No current facility-administered medications for this visit.       Objective:  "  Vital Signs:   Visit Vitals     /80     Pulse (!) 59     Ht 6' 4\" (1.93 m)     Wt (!) 224 lb 9.6 oz (101.9 kg)     SpO2 97%     BMI 27.34 kg/m2        VisionScreening:  No exam data present     PHYSICAL EXAM    General Appearance:    Alert, cooperative, no distress, appears stated age.     Head:    Normocephalic, without obvious abnormality, atraumatic   Eyes:    PERRL, conjunctiva/corneas clear, EOM's intact, fundi     benign, both eyes.  Glasses.        Ears:    Normal TM's and external ear canals, both ears   Nose:   Nares normal, septum midline, mucosa normal, no drainage    or sinus tenderness   Throat:   Lips, mucosa, and tongue normal; teeth and gums normal   Neck:   Supple, symmetrical, trachea midline, no adenopathy;        thyroid:  No enlargement/tenderness/nodules; no carotid    bruit or JVD.  Significant restriction in cervical range of motion noted.   Back:     Symmetric, no curvature, ROM normal, no CVA tenderness   Lungs:     Clear to auscultation bilaterally, respirations unlabored   Chest wall:    No tenderness or deformity   Heart:    Regular rate and rhythm, S1 and S2 normal, no murmur, rub   or gallop   Abdomen:     Soft, non-tender, bowel sounds active all four quadrants,     no masses, no organomegaly.  Small reducible umbilical hernia.   Genitalia:    Normal male without lesion, discharge or tenderness.  No inguinal hernia noted.     Rectal:    Normal tone.  Prostate normal/symmetric, no masses or tenderness.   Extremities:   Extremities normal, atraumatic, no cyanosis or edema.  Right scapular tenderness infraspinatus muscle region without supraspinatus muscle involvement.  Shoulder range of motion with mild restriction and crepitus only.  Distal CMS intact upper extremities.   Pulses:   2+ and symmetric all extremities   Skin:   Skin color, texture, turgor normal, no rashes or lesions   Lymph nodes:   Cervical, supraclavicular, and axillary nodes normal   Neurologic:   CNII-XII " intact. Normal strength, sensation and reflexes       throughout        Assessment Results 8/3/2018   Activities of Daily Living No help needed   Instrumental Activities of Daily Living No help needed   Get Up and Go Score -   Mini Cog Total Score 5   Some recent data might be hidden     A Mini-Cog score of 0-2 suggests the possibility of dementia, score of 3-5 suggests no dementia    Identified Health Risks:     He is at risk for lack of exercise and has been provided with information to increase physical activity for the benefit of his well-being.  Patient's advanced directive was discussed and I am comfortable with the patient's wishes.

## 2021-07-03 NOTE — ADDENDUM NOTE
Addendum Note by Ana Pena CMA at 8/8/2017  2:37 PM     Author: Ana Pena CMA Service: -- Author Type: Certified Medical Assistant    Filed: 8/8/2017  2:37 PM Encounter Date: 8/2/2017 Status: Signed    : Ana Pena CMA (Certified Medical Assistant)    Addended by: ANA PENA on: 8/8/2017 02:37 PM        Modules accepted: Orders